# Patient Record
Sex: FEMALE | Race: WHITE
[De-identification: names, ages, dates, MRNs, and addresses within clinical notes are randomized per-mention and may not be internally consistent; named-entity substitution may affect disease eponyms.]

---

## 2018-02-04 ENCOUNTER — HOSPITAL ENCOUNTER (EMERGENCY)
Dept: HOSPITAL 8 - ED | Age: 68
Discharge: HOME | End: 2018-02-04
Payer: MEDICARE

## 2018-02-04 VITALS — DIASTOLIC BLOOD PRESSURE: 95 MMHG | SYSTOLIC BLOOD PRESSURE: 174 MMHG

## 2018-02-04 VITALS — HEIGHT: 62 IN | BODY MASS INDEX: 20.37 KG/M2 | WEIGHT: 110.67 LBS

## 2018-02-04 DIAGNOSIS — S29.012A: Primary | ICD-10-CM
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
## 2018-02-04 DIAGNOSIS — M51.34: ICD-10-CM
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
## 2018-02-04 DIAGNOSIS — E11.9: ICD-10-CM

## 2018-02-04 LAB
CULTURE INDICATED?: NO
MICROSCOPIC: (no result)

## 2018-02-04 PROCEDURE — 99285 EMERGENCY DEPT VISIT HI MDM: CPT

## 2018-02-04 PROCEDURE — 72072 X-RAY EXAM THORAC SPINE 3VWS: CPT

## 2018-02-04 PROCEDURE — 81003 URINALYSIS AUTO W/O SCOPE: CPT

## 2018-03-08 ENCOUNTER — HOSPITAL ENCOUNTER (OUTPATIENT)
Dept: HOSPITAL 8 - CFH | Age: 68
Discharge: HOME | End: 2018-03-08
Attending: INTERNAL MEDICINE
Payer: MEDICARE

## 2018-03-08 DIAGNOSIS — I08.1: Primary | ICD-10-CM

## 2018-03-08 DIAGNOSIS — Z87.891: ICD-10-CM

## 2018-03-08 DIAGNOSIS — Z85.820: ICD-10-CM

## 2018-03-08 PROCEDURE — 93306 TTE W/DOPPLER COMPLETE: CPT

## 2019-05-21 ENCOUNTER — HOSPITAL ENCOUNTER (OUTPATIENT)
Dept: HOSPITAL 8 - STAR | Age: 69
Discharge: HOME | End: 2019-05-21
Attending: SURGERY
Payer: MEDICARE

## 2019-05-21 DIAGNOSIS — K40.90: ICD-10-CM

## 2019-05-21 DIAGNOSIS — Z01.818: Primary | ICD-10-CM

## 2019-05-21 LAB
ALBUMIN SERPL-MCNC: 3.8 G/DL (ref 3.4–5)
ALP SERPL-CCNC: 65 U/L (ref 45–117)
ALT SERPL-CCNC: 32 U/L (ref 12–78)
ANION GAP SERPL CALC-SCNC: 9 MMOL/L (ref 5–15)
BILIRUB SERPL-MCNC: 0.5 MG/DL (ref 0.2–1)
CALCIUM SERPL-MCNC: 8.7 MG/DL (ref 8.5–10.1)
CHLORIDE SERPL-SCNC: 94 MMOL/L (ref 98–107)
CREAT SERPL-MCNC: 0.7 MG/DL (ref 0.55–1.02)
PROT SERPL-MCNC: 6.6 G/DL (ref 6.4–8.2)

## 2019-05-21 PROCEDURE — 80053 COMPREHEN METABOLIC PANEL: CPT

## 2019-05-21 PROCEDURE — 36415 COLL VENOUS BLD VENIPUNCTURE: CPT

## 2019-05-21 PROCEDURE — 93005 ELECTROCARDIOGRAM TRACING: CPT

## 2019-05-30 ENCOUNTER — HOSPITAL ENCOUNTER (OUTPATIENT)
Dept: HOSPITAL 8 - OUT | Age: 69
Discharge: HOME | End: 2019-05-30
Attending: SURGERY
Payer: MEDICARE

## 2019-05-30 VITALS — WEIGHT: 102.07 LBS | BODY MASS INDEX: 18.78 KG/M2 | HEIGHT: 62 IN

## 2019-05-30 VITALS — DIASTOLIC BLOOD PRESSURE: 85 MMHG | SYSTOLIC BLOOD PRESSURE: 165 MMHG

## 2019-05-30 DIAGNOSIS — Z98.890: ICD-10-CM

## 2019-05-30 DIAGNOSIS — K40.90: Primary | ICD-10-CM

## 2019-05-30 DIAGNOSIS — Z90.710: ICD-10-CM

## 2019-05-30 DIAGNOSIS — Z90.49: ICD-10-CM

## 2019-05-30 DIAGNOSIS — I10: ICD-10-CM

## 2019-05-30 DIAGNOSIS — K21.9: ICD-10-CM

## 2019-05-30 PROCEDURE — C1781 MESH (IMPLANTABLE): HCPCS

## 2019-05-30 PROCEDURE — 49650 LAP ING HERNIA REPAIR INIT: CPT

## 2019-07-31 ENCOUNTER — HOSPITAL ENCOUNTER (OUTPATIENT)
Dept: HOSPITAL 8 - CFH | Age: 69
Discharge: HOME | End: 2019-07-31
Attending: INTERNAL MEDICINE
Payer: MEDICARE

## 2019-07-31 DIAGNOSIS — R07.89: Primary | ICD-10-CM

## 2019-07-31 PROCEDURE — A9502 TC99M TETROFOSMIN: HCPCS

## 2019-07-31 PROCEDURE — 93017 CV STRESS TEST TRACING ONLY: CPT

## 2019-07-31 PROCEDURE — 78452 HT MUSCLE IMAGE SPECT MULT: CPT

## 2019-11-20 ENCOUNTER — HOSPITAL ENCOUNTER (OUTPATIENT)
Dept: HOSPITAL 8 - CVU | Age: 69
Discharge: HOME | End: 2019-11-20
Attending: INTERNAL MEDICINE
Payer: MEDICARE

## 2019-11-20 DIAGNOSIS — Z87.891: ICD-10-CM

## 2019-11-20 DIAGNOSIS — I10: ICD-10-CM

## 2019-11-20 DIAGNOSIS — I65.21: Primary | ICD-10-CM

## 2019-11-20 PROCEDURE — 93880 EXTRACRANIAL BILAT STUDY: CPT

## 2020-03-15 ENCOUNTER — HOSPITAL ENCOUNTER (EMERGENCY)
Dept: HOSPITAL 8 - ED | Age: 70
LOS: 1 days | Discharge: HOME | End: 2020-03-16
Payer: MEDICARE

## 2020-03-15 VITALS — HEIGHT: 62 IN | BODY MASS INDEX: 19.23 KG/M2 | WEIGHT: 104.5 LBS

## 2020-03-15 VITALS — DIASTOLIC BLOOD PRESSURE: 88 MMHG | SYSTOLIC BLOOD PRESSURE: 130 MMHG
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
## 2020-03-15 DIAGNOSIS — E87.1: ICD-10-CM
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
## 2020-03-15 DIAGNOSIS — S09.90XA: Primary | ICD-10-CM

## 2020-03-15 DIAGNOSIS — R55: ICD-10-CM
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
## 2020-03-15 DIAGNOSIS — I25.2: ICD-10-CM

## 2020-03-15 DIAGNOSIS — M54.2: ICD-10-CM

## 2020-03-15 DIAGNOSIS — R42: ICD-10-CM

## 2020-03-15 DIAGNOSIS — Z87.891: ICD-10-CM

## 2020-03-15 DIAGNOSIS — E11.9: ICD-10-CM

## 2020-03-15 LAB
ALBUMIN SERPL-MCNC: 3.9 G/DL (ref 3.4–5)
ALP SERPL-CCNC: 68 U/L (ref 45–117)
ALT SERPL-CCNC: 46 U/L (ref 12–78)
ANION GAP SERPL CALC-SCNC: 14 MMOL/L (ref 5–15)
BASOPHILS # BLD AUTO: 0.01 X10^3/UL (ref 0–0.1)
BASOPHILS NFR BLD AUTO: 0 % (ref 0–1)
BILIRUB SERPL-MCNC: 1.4 MG/DL (ref 0.2–1)
CALCIUM SERPL-MCNC: 8.5 MG/DL (ref 8.5–10.1)
CHLORIDE SERPL-SCNC: 89 MMOL/L (ref 98–107)
CREAT SERPL-MCNC: 0.69 MG/DL (ref 0.55–1.02)
CULTURE INDICATED?: NO
EOSINOPHIL # BLD AUTO: 0.01 X10^3/UL (ref 0–0.4)
EOSINOPHIL NFR BLD AUTO: 0 % (ref 1–7)
ERYTHROCYTE [DISTWIDTH] IN BLOOD BY AUTOMATED COUNT: 14 % (ref 9.6–15.2)
LYMPHOCYTES # BLD AUTO: 1.53 X10^3/UL (ref 1–3.4)
LYMPHOCYTES NFR BLD AUTO: 20 % (ref 22–44)
MCH RBC QN AUTO: 32.7 PG (ref 27–34.8)
MCHC RBC AUTO-ENTMCNC: 33.9 G/DL (ref 32.4–35.8)
MCV RBC AUTO: 96.4 FL (ref 80–100)
MD: NO
MICROSCOPIC: (no result)
MONOCYTES # BLD AUTO: 0.48 X10^3/UL (ref 0.2–0.8)
MONOCYTES NFR BLD AUTO: 6 % (ref 2–9)
NEUTROPHILS # BLD AUTO: 5.61 X10^3/UL (ref 1.8–6.8)
NEUTROPHILS NFR BLD AUTO: 73 % (ref 42–75)
PLATELET # BLD AUTO: 289 X10^3/UL (ref 130–400)
PMV BLD AUTO: 7.1 FL (ref 7.4–10.4)
PROT SERPL-MCNC: 6.6 G/DL (ref 6.4–8.2)
RBC # BLD AUTO: 3.94 X10^6/UL (ref 3.82–5.3)
TROPONIN I SERPL-MCNC: < 0.015 NG/ML (ref 0–0.04)

## 2020-03-15 PROCEDURE — 70450 CT HEAD/BRAIN W/O DYE: CPT

## 2020-03-15 PROCEDURE — 84484 ASSAY OF TROPONIN QUANT: CPT

## 2020-03-15 PROCEDURE — 72125 CT NECK SPINE W/O DYE: CPT

## 2020-03-15 PROCEDURE — 85025 COMPLETE CBC W/AUTO DIFF WBC: CPT

## 2020-03-15 PROCEDURE — 96360 HYDRATION IV INFUSION INIT: CPT

## 2020-03-15 PROCEDURE — 81003 URINALYSIS AUTO W/O SCOPE: CPT

## 2020-03-15 PROCEDURE — 99285 EMERGENCY DEPT VISIT HI MDM: CPT

## 2020-03-15 PROCEDURE — 80053 COMPREHEN METABOLIC PANEL: CPT

## 2020-03-15 PROCEDURE — 93005 ELECTROCARDIOGRAM TRACING: CPT

## 2020-03-15 PROCEDURE — 36415 COLL VENOUS BLD VENIPUNCTURE: CPT

## 2020-03-15 PROCEDURE — 71045 X-RAY EXAM CHEST 1 VIEW: CPT

## 2021-01-15 DIAGNOSIS — Z23 NEED FOR VACCINATION: ICD-10-CM

## 2022-08-15 ENCOUNTER — OFFICE VISIT (OUTPATIENT)
Dept: CARDIOLOGY | Facility: MEDICAL CENTER | Age: 72
End: 2022-08-15
Payer: MEDICARE

## 2022-08-15 VITALS
HEIGHT: 62 IN | BODY MASS INDEX: 18.4 KG/M2 | SYSTOLIC BLOOD PRESSURE: 126 MMHG | WEIGHT: 100 LBS | RESPIRATION RATE: 16 BRPM | HEART RATE: 72 BPM | OXYGEN SATURATION: 94 % | DIASTOLIC BLOOD PRESSURE: 84 MMHG

## 2022-08-15 DIAGNOSIS — F41.1 GAD (GENERALIZED ANXIETY DISORDER): ICD-10-CM

## 2022-08-15 DIAGNOSIS — I10 PRIMARY HYPERTENSION: ICD-10-CM

## 2022-08-15 DIAGNOSIS — I47.10 PSVT (PAROXYSMAL SUPRAVENTRICULAR TACHYCARDIA) (HCC): ICD-10-CM

## 2022-08-15 DIAGNOSIS — F40.00 AGORAPHOBIA: ICD-10-CM

## 2022-08-15 PROCEDURE — 99203 OFFICE O/P NEW LOW 30 MIN: CPT | Performed by: INTERNAL MEDICINE

## 2022-08-15 PROCEDURE — 93000 ELECTROCARDIOGRAM COMPLETE: CPT | Performed by: INTERNAL MEDICINE

## 2022-08-15 RX ORDER — MULTIVIT-MIN/IRON/FOLIC ACID/K 18-600-40
4000 CAPSULE ORAL DAILY
COMMUNITY

## 2022-08-15 RX ORDER — AMLODIPINE BESYLATE 2.5 MG/1
2.5 TABLET ORAL 2 TIMES DAILY
Qty: 180 TABLET | Refills: 3 | Status: SHIPPED | OUTPATIENT
Start: 2022-08-15

## 2022-08-15 RX ORDER — ACYCLOVIR 50 MG/G
OINTMENT TOPICAL PRN
COMMUNITY
End: 2023-03-28

## 2022-08-15 RX ORDER — METOPROLOL SUCCINATE 25 MG/1
12.5 TABLET, EXTENDED RELEASE ORAL 2 TIMES DAILY
Qty: 45 TABLET | Refills: 3 | Status: SHIPPED | OUTPATIENT
Start: 2022-08-15 | End: 2022-08-18 | Stop reason: SDUPTHER

## 2022-08-15 RX ORDER — RABEPRAZOLE SODIUM 20 MG/1
20 TABLET, DELAYED RELEASE ORAL DAILY
COMMUNITY

## 2022-08-15 RX ORDER — AMLODIPINE BESYLATE 2.5 MG/1
2.5 TABLET ORAL 2 TIMES DAILY
COMMUNITY
End: 2022-08-15 | Stop reason: SDUPTHER

## 2022-08-15 RX ORDER — METOPROLOL SUCCINATE 25 MG/1
12.5 TABLET, EXTENDED RELEASE ORAL 2 TIMES DAILY
COMMUNITY
End: 2022-08-15 | Stop reason: SDUPTHER

## 2022-08-15 NOTE — PROGRESS NOTES
Chief Complaint   Patient presents with    HTN (Controlled)     New patient       Dejuan Villatoro is a 72 y.o. female who presents today for initial consultation regarding PSVT.  She is a former patient of Dr. Galvan I did not realize that he is back practicing in [facility] after leaving [place].  She has well-characterized brief PSVT with a structurally normal heart and no ischemic heart disease.  She takes her medications as directed and complains of no cardiac symptomology.  She is a non-smoker does not drink or do drugs and has no family history of precocious CAD.  Her most concerning issue is that she indicates she barely ever leaves her home since the pandemic started and is terrified even to visit today at this visit.  She has cut off all social contact with all people that she have her new and is afraid to even go to the doctor's visit.    History reviewed. No pertinent past medical history.  History reviewed. No pertinent surgical history.  History reviewed. No pertinent family history.  Social History     Socioeconomic History    Marital status: Not on file     Spouse name: Not on file    Number of children: Not on file    Years of education: Not on file    Highest education level: Not on file   Occupational History    Not on file   Tobacco Use    Smoking status: Never    Smokeless tobacco: Never   Substance and Sexual Activity    Alcohol use: Never    Drug use: Never    Sexual activity: Not on file   Other Topics Concern    Not on file   Social History Narrative    Not on file     Social Determinants of Health     Financial Resource Strain: Not on file   Food Insecurity: Not on file   Transportation Needs: Not on file   Physical Activity: Not on file   Stress: Not on file   Social Connections: Not on file   Intimate Partner Violence: Not on file   Housing Stability: Not on file     Not on File  Outpatient Encounter Medications as of 8/15/2022   Medication Sig Dispense Refill    rabeprazole  "(ACIPHEX) 20 MG tablet Take 20 mg by mouth every day.      acyclovir (ZOVIRAX) 5 % Ointment Apply  topically as needed.      artificial tears (EYE LUBRICANT) Ointment ophth ointment Apply 1 Application to both eyes every 8 hours.      Cholecalciferol (VITAMIN D) 50 MCG (2000 UT) Cap Take  by mouth.      Multiple Vitamin (MULTIVITAMIN ADULT PO) Take  by mouth.      amLODIPine (NORVASC) 2.5 MG Tab Take 1 Tablet by mouth 2 times a day. 180 Tablet 3    metoprolol SR (TOPROL XL) 25 MG TABLET SR 24 HR Take 0.5 Tablets by mouth 2 times a day. 12.5 mg BID 45 Tablet 3    [DISCONTINUED] metoprolol SR (TOPROL XL) 25 MG TABLET SR 24 HR Take 12.5 mg by mouth 2 times a day. 12.5 mg BID      [DISCONTINUED] amLODIPine (NORVASC) 2.5 MG Tab Take 2.5 mg by mouth 2 times a day.       No facility-administered encounter medications on file as of 8/15/2022.     Review of Systems   All other systems reviewed and are negative.           Objective     /84 (BP Location: Left arm, Patient Position: Sitting)   Pulse 72   Resp 16   Ht 1.575 m (5' 2\")   Wt 45.4 kg (100 lb)   SpO2 94%   BMI 18.29 kg/m²     Physical Exam  Vitals and nursing note reviewed.   Constitutional:       General: She is not in acute distress.     Appearance: Normal appearance.   HENT:      Head: Normocephalic and atraumatic.      Right Ear: External ear normal.      Left Ear: External ear normal.      Nose: Nose normal.   Eyes:      Conjunctiva/sclera: Conjunctivae normal.   Cardiovascular:      Rate and Rhythm: Normal rate and regular rhythm.      Pulses: Normal pulses.      Heart sounds: No murmur heard.  Pulmonary:      Effort: Pulmonary effort is normal. No respiratory distress.      Breath sounds: Normal breath sounds.   Abdominal:      General: There is no distension.      Palpations: Abdomen is soft.   Musculoskeletal:      Cervical back: No rigidity or tenderness.      Right lower leg: No edema.      Left lower leg: No edema.   Skin:     General: Skin is " warm and dry.      Capillary Refill: Capillary refill takes 2 to 3 seconds.   Neurological:      General: No focal deficit present.      Mental Status: She is alert and oriented to person, place, and time.   Psychiatric:         Mood and Affect: Mood normal.         Behavior: Behavior normal.         Thought Content: Thought content normal.     EKG (8/15/2022):  I have personally reviewed the EKG this visit and discussed with the patient.  Sinus rhythm 65 bpm left atrial abnormality.           Assessment & Plan     1. Primary hypertension  EKG    amLODIPine (NORVASC) 2.5 MG Tab    metoprolol SR (TOPROL XL) 25 MG TABLET SR 24 HR      2. PSVT (paroxysmal supraventricular tachycardia) ([facility])  metoprolol SR (TOPROL XL) 25 MG TABLET SR 24 HR      3. WAQAR (generalized anxiety disorder)  Referral to Psychology      4. Agoraphobia  Referral to Psychology          Medical Decision Making: Today's Assessment/Status/Plan:          From a cardiac standpoint she is doing well with well-managed PSVT which is brief nonsustained and her medications were refilled without change.  More pressing is for severe generalized anxiety that is impacting her health as well as  interfering with her interface with medical treatment.  It also appears to have a component of agoraphobia.  We discussed that I am not an expert in these matters and I recommend professional evaluation and management.  She is agreeable to talk with a psychologist if available.  I will make a referral.  Otherwise continue current medical therapy.      We did discuss that Dr. Galvan is back in practice and together we looked up his website and I wrote down the phone number and address for her as she would like to continue to be as patient.  I have encouraged her to do so.  I am happy to see her if he is not available from time to time as well.    Thank you for this interesting consultation. It was my pleasure to see Tonia Villatoro today.    Malcolm Bender MD, FACC,  FSCAI  Division of Interventional Cardiology  [facility]

## 2022-08-16 LAB — EKG IMPRESSION: NORMAL

## 2022-08-18 ENCOUNTER — TELEPHONE (OUTPATIENT)
Dept: CARDIOLOGY | Facility: MEDICAL CENTER | Age: 72
End: 2022-08-18
Payer: MEDICARE

## 2022-08-18 DIAGNOSIS — I10 PRIMARY HYPERTENSION: ICD-10-CM

## 2022-08-18 DIAGNOSIS — I47.10 PSVT (PAROXYSMAL SUPRAVENTRICULAR TACHYCARDIA) (HCC): ICD-10-CM

## 2022-08-18 RX ORDER — METOPROLOL SUCCINATE 25 MG/1
12.5 TABLET, EXTENDED RELEASE ORAL 2 TIMES DAILY
Qty: 45 TABLET | Refills: 0 | Status: SHIPPED | OUTPATIENT
Start: 2022-08-18 | End: 2022-10-02

## 2022-08-18 RX ORDER — METOPROLOL SUCCINATE 25 MG/1
12.5 TABLET, EXTENDED RELEASE ORAL 2 TIMES DAILY
Qty: 90 TABLET | Refills: 3 | Status: SHIPPED | OUTPATIENT
Start: 2022-08-18

## 2022-08-18 NOTE — TELEPHONE ENCOUNTER
Noted RX for metoprolol was sent for 45 tablets, sig is 0.5 tablets BID. RX re-sent for 90 tablets for 90-day supply.    Called pt and notified her of above. She said she only got a bottle of 45 tablets. She is requesting another bottle of 45 tablets to complete her 90-day supply. Separate RX sent. Reassured pt will call [facility] to confirm RXs were received. She is requesting a call back with this confirmation.    Will call [facility] a little later to allow time for processing and call pt with updates.

## 2022-08-18 NOTE — TELEPHONE ENCOUNTER
"Called [facility] pharmacy, spoke with Marlon and confirmed he received metoprolol RXs. Per Marlon, insurance coverage will kick in on 09/07/22 however pt can  prescription now out-of-pocket.    Called pt and advised above. She said, \"I get told one thing and it's done differently. Me calling is a totally different thing, it's like they don't have time.\"    Pt expressed in detail her concerns about not wanting to leave her house and her current partner Juanjose is the person who normally picks up her prescriptions after work. She expressed that she doesn't have any family or any other person that can help her. Inquired pt if she would benefit from a case or a  and she declined.    Pt expressed concerns and fear that her prescription is now \"all screwed up\" and she will not get the refills that she usually gets. Reassured pt to the best of my ability that there are separate prescriptions for her metoprolol and she will still have enough refills for a full year until next August.     Reassured pt that [facility] can call our office if needed and she can call our office if any issues/further concerns. She verbalized understanding and was very appreciative.    50 minutes total spent on telephone encounter and additional 10 minutes for charting.  "

## 2022-08-18 NOTE — TELEPHONE ENCOUNTER
TW      Caller: Tonia Villatoro    Topic/issue: Patient called because she only got half of her medication for metoprolol SR (TOPROL XL) 25 MG TABLET SR 24 HR.  It was only half the amount and she was asking what was going on with her prescription  Callback Number: [phone] (home)       Thank you  -Filiberto HUERTA

## 2022-08-19 NOTE — TELEPHONE ENCOUNTER
Called pt and discussed further. She said she received a call from [employer] advising her that they will cover her prescription for a 90-day supply. Pt requested that this RN call [facility] pharmacy to confirm this information and that she will have 3 additional refills after she picks up her prescription.    Called [facility] pharmacy and spoke with pharmacist. They received confirmation today that pt's insurance will cover the prescription and she has 3 additional refills available.    Called and relayed this information to pt. She expressed that she was very concerned that the pharmacy will note that she only has 2 refills left of the medication after she picks up her prescription because she had already picked up a 45-tablet bottle 2 days ago.    Reassured pt that this is separate prescription from what she picked up 2 days ago and assured her that there will be an additional 3 refills after she picks it up. The pt then kept insisting that this RN should call back the pharmacy to confirm this information. Pt also repeated multiple times that the pharmacy will note that she will only have 2 refills left and not 3. Reassured pt to the best of my ability that this will not happen. If any issues, she can have the pharmacy give our office a call.    She then expressed concerns that if the pharmacy calls our office, they will not wait on hold to discuss the prescription. Pt repeated this information multiple times. Advised pt again that she can  her prescription and there will be 3 additional refills. She will have her spouse  the medication and advised she should call him while he's in the pharmacy for her reassurance.    She verbalized understanding and she will call her , appreciative of call back.    21 minutes spend on the phone reassuring pt and additional 20 minutes spent with charting and discussion with practice manager.

## 2022-08-19 NOTE — TELEPHONE ENCOUNTER
Caller:  Marilu Randall    Topic/issue: Tonia is still concerned about the correct amount on this script, before picking up.      Callback Number: [phone]    Thank you,   Cyndee HICKS

## 2023-03-28 ENCOUNTER — APPOINTMENT (OUTPATIENT)
Dept: RADIOLOGY | Facility: MEDICAL CENTER | Age: 73
End: 2023-03-28
Attending: EMERGENCY MEDICINE
Payer: MEDICARE

## 2023-03-28 ENCOUNTER — HOSPITAL ENCOUNTER (OUTPATIENT)
Facility: MEDICAL CENTER | Age: 73
End: 2023-03-29
Attending: EMERGENCY MEDICINE | Admitting: STUDENT IN AN ORGANIZED HEALTH CARE EDUCATION/TRAINING PROGRAM
Payer: MEDICARE

## 2023-03-28 DIAGNOSIS — R41.3 MEMORY CHANGES: ICD-10-CM

## 2023-03-28 DIAGNOSIS — I10 PRIMARY HYPERTENSION: ICD-10-CM

## 2023-03-28 DIAGNOSIS — R29.6 MULTIPLE FALLS: ICD-10-CM

## 2023-03-28 DIAGNOSIS — R07.9 CHEST PAIN, UNSPECIFIED TYPE: ICD-10-CM

## 2023-03-28 DIAGNOSIS — R55 NEAR SYNCOPE: ICD-10-CM

## 2023-03-28 PROBLEM — D72.829 LEUKOCYTOSIS: Status: ACTIVE | Noted: 2023-03-28
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
## 2023-03-28 LAB
ALBUMIN SERPL BCP-MCNC: 4.4 G/DL (ref 3.2–4.9)
ALBUMIN/GLOB SERPL: 1.5 G/DL
ALP SERPL-CCNC: 132 U/L (ref 30–99)
ALT SERPL-CCNC: 48 U/L (ref 2–50)
ANION GAP SERPL CALC-SCNC: 12 MMOL/L (ref 7–16)
AST SERPL-CCNC: 46 U/L (ref 12–45)
BASOPHILS # BLD AUTO: 0.1 % (ref 0–1.8)
BASOPHILS # BLD: 0.02 K/UL (ref 0–0.12)
BILIRUB SERPL-MCNC: 0.9 MG/DL (ref 0.1–1.5)
BUN SERPL-MCNC: 15 MG/DL (ref 8–22)
CALCIUM ALBUM COR SERPL-MCNC: 9.3 MG/DL (ref 8.5–10.5)
CALCIUM SERPL-MCNC: 9.6 MG/DL (ref 8.5–10.5)
CHLORIDE SERPL-SCNC: 97 MMOL/L (ref 96–112)
CO2 SERPL-SCNC: 25 MMOL/L (ref 20–33)
CREAT SERPL-MCNC: 0.66 MG/DL (ref 0.5–1.4)
EKG IMPRESSION: NORMAL
EOSINOPHIL # BLD AUTO: 0 K/UL (ref 0–0.51)
EOSINOPHIL NFR BLD: 0 % (ref 0–6.9)
ERYTHROCYTE [DISTWIDTH] IN BLOOD BY AUTOMATED COUNT: 50.4 FL (ref 35.9–50)
GFR SERPLBLD CREATININE-BSD FMLA CKD-EPI: 92 ML/MIN/1.73 M 2
GLOBULIN SER CALC-MCNC: 3 G/DL (ref 1.9–3.5)
GLUCOSE SERPL-MCNC: 132 MG/DL (ref 65–99)
HCT VFR BLD AUTO: 37.8 % (ref 37–47)
HGB BLD-MCNC: 12.4 G/DL (ref 12–16)
IMM GRANULOCYTES # BLD AUTO: 0.07 K/UL (ref 0–0.11)
IMM GRANULOCYTES NFR BLD AUTO: 0.5 % (ref 0–0.9)
LYMPHOCYTES # BLD AUTO: 0.71 K/UL (ref 1–4.8)
LYMPHOCYTES NFR BLD: 5 % (ref 22–41)
MCH RBC QN AUTO: 30.3 PG (ref 27–33)
MCHC RBC AUTO-ENTMCNC: 32.8 G/DL (ref 33.6–35)
MCV RBC AUTO: 92.4 FL (ref 81.4–97.8)
MONOCYTES # BLD AUTO: 0.83 K/UL (ref 0–0.85)
MONOCYTES NFR BLD AUTO: 5.8 % (ref 0–13.4)
NEUTROPHILS # BLD AUTO: 12.59 K/UL (ref 2–7.15)
NEUTROPHILS NFR BLD: 88.6 % (ref 44–72)
NRBC # BLD AUTO: 0 K/UL
NRBC BLD-RTO: 0 /100 WBC
PLATELET # BLD AUTO: 325 K/UL (ref 164–446)
PMV BLD AUTO: 9.3 FL (ref 9–12.9)
POTASSIUM SERPL-SCNC: 4.5 MMOL/L (ref 3.6–5.5)
PROT SERPL-MCNC: 7.4 G/DL (ref 6–8.2)
RBC # BLD AUTO: 4.09 M/UL (ref 4.2–5.4)
SODIUM SERPL-SCNC: 134 MMOL/L (ref 135–145)
TROPONIN T SERPL-MCNC: 22 NG/L (ref 6–19)
TROPONIN T SERPL-MCNC: 24 NG/L (ref 6–19)
WBC # BLD AUTO: 14.2 K/UL (ref 4.8–10.8)

## 2023-03-28 PROCEDURE — G0378 HOSPITAL OBSERVATION PER HR: HCPCS

## 2023-03-28 PROCEDURE — 96375 TX/PRO/DX INJ NEW DRUG ADDON: CPT | Mod: XU

## 2023-03-28 PROCEDURE — 72125 CT NECK SPINE W/O DYE: CPT

## 2023-03-28 PROCEDURE — 71045 X-RAY EXAM CHEST 1 VIEW: CPT

## 2023-03-28 PROCEDURE — 700117 HCHG RX CONTRAST REV CODE 255: Performed by: EMERGENCY MEDICINE

## 2023-03-28 PROCEDURE — 96374 THER/PROPH/DIAG INJ IV PUSH: CPT | Mod: XU

## 2023-03-28 PROCEDURE — 72128 CT CHEST SPINE W/O DYE: CPT

## 2023-03-28 PROCEDURE — 72131 CT LUMBAR SPINE W/O DYE: CPT

## 2023-03-28 PROCEDURE — 93005 ELECTROCARDIOGRAM TRACING: CPT | Performed by: EMERGENCY MEDICINE

## 2023-03-28 PROCEDURE — 85025 COMPLETE CBC W/AUTO DIFF WBC: CPT

## 2023-03-28 PROCEDURE — 80053 COMPREHEN METABOLIC PANEL: CPT

## 2023-03-28 PROCEDURE — 96372 THER/PROPH/DIAG INJ SC/IM: CPT | Mod: XU

## 2023-03-28 PROCEDURE — 99285 EMERGENCY DEPT VISIT HI MDM: CPT

## 2023-03-28 PROCEDURE — 700102 HCHG RX REV CODE 250 W/ 637 OVERRIDE(OP)

## 2023-03-28 PROCEDURE — A9270 NON-COVERED ITEM OR SERVICE: HCPCS

## 2023-03-28 PROCEDURE — 84484 ASSAY OF TROPONIN QUANT: CPT

## 2023-03-28 PROCEDURE — 700111 HCHG RX REV CODE 636 W/ 250 OVERRIDE (IP)

## 2023-03-28 PROCEDURE — 71260 CT THORAX DX C+: CPT

## 2023-03-28 PROCEDURE — 700111 HCHG RX REV CODE 636 W/ 250 OVERRIDE (IP): Performed by: EMERGENCY MEDICINE

## 2023-03-28 PROCEDURE — 36415 COLL VENOUS BLD VENIPUNCTURE: CPT

## 2023-03-28 PROCEDURE — 70450 CT HEAD/BRAIN W/O DYE: CPT

## 2023-03-28 PROCEDURE — 93005 ELECTROCARDIOGRAM TRACING: CPT

## 2023-03-28 PROCEDURE — 99223 1ST HOSP IP/OBS HIGH 75: CPT | Performed by: STUDENT IN AN ORGANIZED HEALTH CARE EDUCATION/TRAINING PROGRAM

## 2023-03-28 PROCEDURE — 700101 HCHG RX REV CODE 250

## 2023-03-28 RX ORDER — AMLODIPINE BESYLATE 5 MG/1
2.5 TABLET ORAL
Status: DISCONTINUED | OUTPATIENT
Start: 2023-03-28 | End: 2023-03-29 | Stop reason: HOSPADM

## 2023-03-28 RX ORDER — CARBOXYMETHYLCELLULOSE SODIUM 10 MG/ML
1 GEL OPHTHALMIC PRN
COMMUNITY

## 2023-03-28 RX ORDER — ONDANSETRON 2 MG/ML
4 INJECTION INTRAMUSCULAR; INTRAVENOUS ONCE
Status: COMPLETED | OUTPATIENT
Start: 2023-03-28 | End: 2023-03-28

## 2023-03-28 RX ORDER — METOPROLOL SUCCINATE 25 MG/1
12.5 TABLET, EXTENDED RELEASE ORAL 2 TIMES DAILY
Status: DISCONTINUED | OUTPATIENT
Start: 2023-03-28 | End: 2023-03-29 | Stop reason: HOSPADM

## 2023-03-28 RX ORDER — MORPHINE SULFATE 4 MG/ML
4 INJECTION INTRAVENOUS ONCE
Status: COMPLETED | OUTPATIENT
Start: 2023-03-28 | End: 2023-03-28

## 2023-03-28 RX ORDER — ENOXAPARIN SODIUM 100 MG/ML
30 INJECTION SUBCUTANEOUS DAILY
Status: DISCONTINUED | OUTPATIENT
Start: 2023-03-28 | End: 2023-03-29 | Stop reason: HOSPADM

## 2023-03-28 RX ORDER — LIDOCAINE 50 MG/G
1 PATCH TOPICAL EVERY 24 HOURS
Status: DISCONTINUED | OUTPATIENT
Start: 2023-03-28 | End: 2023-03-29 | Stop reason: HOSPADM

## 2023-03-28 RX ORDER — CARBOXYMETHYLCELLULOSE SODIUM 10 MG/ML
1 GEL OPHTHALMIC PRN
Status: DISCONTINUED | OUTPATIENT
Start: 2023-03-28 | End: 2023-03-29 | Stop reason: HOSPADM

## 2023-03-28 RX ORDER — OMEPRAZOLE 20 MG/1
20 CAPSULE, DELAYED RELEASE ORAL DAILY
Status: DISCONTINUED | OUTPATIENT
Start: 2023-03-29 | End: 2023-03-29 | Stop reason: HOSPADM

## 2023-03-28 RX ADMIN — LIDOCAINE 1 PATCH: 50 PATCH TOPICAL at 21:37

## 2023-03-28 RX ADMIN — MORPHINE SULFATE 4 MG: 4 INJECTION, SOLUTION INTRAMUSCULAR; INTRAVENOUS at 16:44

## 2023-03-28 RX ADMIN — METOPROLOL SUCCINATE 12.5 MG: 25 TABLET, EXTENDED RELEASE ORAL at 21:38

## 2023-03-28 RX ADMIN — ONDANSETRON HYDROCHLORIDE 4 MG: 2 SOLUTION INTRAMUSCULAR; INTRAVENOUS at 16:44

## 2023-03-28 RX ADMIN — IOHEXOL 75 ML: 350 INJECTION, SOLUTION INTRAVENOUS at 17:55

## 2023-03-28 RX ADMIN — ENOXAPARIN SODIUM 30 MG: 30 INJECTION SUBCUTANEOUS at 21:37

## 2023-03-28 RX ADMIN — AMLODIPINE BESYLATE 2.5 MG: 5 TABLET ORAL at 21:38

## 2023-03-28 ASSESSMENT — ENCOUNTER SYMPTOMS
ORTHOPNEA: 0
SHORTNESS OF BREATH: 0
COUGH: 0
HEADACHES: 0
ABDOMINAL PAIN: 0
CLAUDICATION: 0
BRUISES/BLEEDS EASILY: 0
MEMORY LOSS: 1
WEAKNESS: 0
SORE THROAT: 0
DIARRHEA: 0
BLOOD IN STOOL: 0
CONSTIPATION: 0
BLURRED VISION: 0
NAUSEA: 0
CHILLS: 0
WEIGHT LOSS: 0
EYE DISCHARGE: 0
DOUBLE VISION: 0
INSOMNIA: 0
WHEEZING: 0
DIZZINESS: 0
NECK PAIN: 0
SPUTUM PRODUCTION: 0
HEARTBURN: 0
DEPRESSION: 0
PALPITATIONS: 0
SENSORY CHANGE: 0
BACK PAIN: 0
FEVER: 0
FALLS: 1
VOMITING: 0
NERVOUS/ANXIOUS: 0

## 2023-03-28 ASSESSMENT — HEART SCORE
HEART SCORE: 6
TROPONIN: 1-3 TIMES NORMAL LIMIT
RISK FACTORS: 1-2 RISK FACTORS
AGE: 65+
ECG: NON-SPECIFIC REPOLARIZATION DISTURBANCE
HISTORY: MODERATELY SUSPICIOUS

## 2023-03-28 ASSESSMENT — PATIENT HEALTH QUESTIONNAIRE - PHQ9
2. FEELING DOWN, DEPRESSED, IRRITABLE, OR HOPELESS: NOT AT ALL
1. LITTLE INTEREST OR PLEASURE IN DOING THINGS: NOT AT ALL
SUM OF ALL RESPONSES TO PHQ9 QUESTIONS 1 AND 2: 0

## 2023-03-28 ASSESSMENT — LIFESTYLE VARIABLES
EVER HAD A DRINK FIRST THING IN THE MORNING TO STEADY YOUR NERVES TO GET RID OF A HANGOVER: NO
HAVE YOU EVER FELT YOU SHOULD CUT DOWN ON YOUR DRINKING: NO
HAVE PEOPLE ANNOYED YOU BY CRITICIZING YOUR DRINKING: NO
TOTAL SCORE: 0
EVER FELT BAD OR GUILTY ABOUT YOUR DRINKING: NO
HOW MANY TIMES IN THE PAST YEAR HAVE YOU HAD 5 OR MORE DRINKS IN A DAY: 0
TOTAL SCORE: 0
AVERAGE NUMBER OF DAYS PER WEEK YOU HAVE A DRINK CONTAINING ALCOHOL: 0
TOTAL SCORE: 0
ON A TYPICAL DAY WHEN YOU DRINK ALCOHOL HOW MANY DRINKS DO YOU HAVE: 0
CONSUMPTION TOTAL: NEGATIVE
ALCOHOL_USE: NO

## 2023-03-28 ASSESSMENT — FIBROSIS 4 INDEX: FIB4 SCORE: 1.61

## 2023-03-28 ASSESSMENT — PAIN DESCRIPTION - PAIN TYPE: TYPE: ACUTE PAIN

## 2023-03-28 NOTE — ED PROVIDER NOTES
"ED Provider Note    CHIEF COMPLAINT  Chief Complaint   Patient presents with    Chest Pain     Chest pain that started on Sunday. Pt states she has pain all over, more so on her left side. Nonspecific pain.    Other     Multiple complaints, reports she has been more weak since Sunday and has had a couple of falls the previous few days. No head injury reported. Pt states she felt like she was dying but wanted her \"heavenly father to make the decision\". Pt very anxious in triage.        EXTERNAL RECORDS REVIEWED  Outpatient Notes cardiology  her for hypertension, PSVT placed her on amlodipine 5 mg a day and metoprolol XLSR 25 mg 24 hours    HPI/ROS  LIMITATION TO HISTORY   Select: : None  OUTSIDE HISTORIAN(S):  Significant other 's ex- who has been living with her and taking care of her for the last 24 years states that since COVID started she has become agoraphobic and will not leave the house.  She has stopped cooking.  She has been more unsteady on her feet and has fallen 3 times in this last month.  He states that all she does is watch the news and she has had a lot of lapses in memory that are not characteristic for her.  Has not noticed any facial droop or weakness on one side of her body more than the other.    Tonia Villatoro is a 72 y.o. female who presents in the care of her significant other with chest pain that started on Sunday after falling and hitting her chest on the ground.  Patient does not think she hit her head but is complaining of some pain in her neck as well.  States that prior to her fall she had a near syncopal episode.  When she fell she thought she was dying and was waiting for God to make the decision for her.  She says ever since her fall on Sunday she has had a lot of pain in her chest with any motion as well as some pain in her abdomen.  She denies any productive cough fevers or chills.  She states her appetite has been decreased.  States that she has not really left " "the house or done any exercises.  She states that she does have a cardiologist but does not have a primary care doctor.  She states she has been taking her blood pressure medications as directed.  She has a history of tobacco use but currently does not smoke drink or use drugs.    PAST MEDICAL HISTORY       SURGICAL HISTORY  patient denies any surgical history    FAMILY HISTORY  History reviewed. No pertinent family history.    SOCIAL HISTORY  Social History     Tobacco Use    Smoking status: Never    Smokeless tobacco: Never   Substance and Sexual Activity    Alcohol use: Never    Drug use: Never    Sexual activity: Not on file       CURRENT MEDICATIONS  Home Medications       Reviewed by Aline Vera (Pharmacy Tech) on 03/28/23 at 1916  Med List Status: Complete     Medication Last Dose Status   amLODIPine (NORVASC) 2.5 MG Tab 3/27/2023 Active   Cholecalciferol (VITAMIN D) 50 MCG (2000 UT) Cap 3/27/2023 Active   metoprolol SR (TOPROL XL) 25 MG TABLET SR 24 HR 3/28/2023 Active   Multiple Vitamins-Minerals (CENTRUM SILVER 50+WOMEN) Tab 3/28/2023 Active   rabeprazole (ACIPHEX) 20 MG tablet 3/28/2023 Active                    ALLERGIES  No Known Allergies    PHYSICAL EXAM  VITAL SIGNS: /80   Pulse 81   Temp 37.1 °C (98.8 °F) (Temporal)   Resp 18   Ht 1.575 m (5' 2\")   Wt 43.5 kg (96 lb)   SpO2 97%   BMI 17.56 kg/m²      Constitutional: Well developed, thin, No acute distress, Non-toxic appearance.  Anxious  HEENT: Normocephalic, Atraumatic,  external ears normal, pharynx pink,  Mucous  Membranes moist, No rhinorrhea or mucosal edema  Eyes: PERRL, EOMI, Conjunctiva normal, No discharge.   Neck: Normal range of motion, No tenderness, Supple, No stridor.   Lymphatic: No lymphadenopathy    Cardiovascular: Regular Rate and Rhythm, No murmurs,  rubs, or gallops.    Thorax & Lungs: Lungs clear to auscultation bilaterally, No respiratory distress, No wheezes, rhales or rhonchi, positive  chest wall " tenderness worse on the left side of the chest on the right.  No obvious contusions bony step-offs abrasions or subcutaneous emphysema.   Abdomen: Bowel sounds normal, Soft, non tender, non distended,  No pulsatile masses., no rebound guarding or peritoneal signs.   Skin: Warm, Dry, No erythema, No rash,   Back:  No CVA tenderness, paraspinous tenderness in the thoracic spine area bony crepitance step offs or instability.   Extremities: Equal, intact distal pulses, No cyanosis, clubbing or edema,  No tenderness.   Musculoskeletal: Good range of motion in all major joints. No tenderness to palpation or major deformities noted.   Neurologic: Alert & oriented x 3, Cranial nerves II-XII intact, Equal strength and sensation upper and lower extremities bilaterally,  No focal deficits noted.  NIH is 0  Psychiatric: Anxious    DIAGNOSTIC STUDIES / PROCEDURES  EKG  I have independently interpreted this EKG  See below    LABS  Results for orders placed or performed during the hospital encounter of 03/28/23   CBC with Differential   Result Value Ref Range    WBC 14.2 (H) 4.8 - 10.8 K/uL    RBC 4.09 (L) 4.20 - 5.40 M/uL    Hemoglobin 12.4 12.0 - 16.0 g/dL    Hematocrit 37.8 37.0 - 47.0 %    MCV 92.4 81.4 - 97.8 fL    MCH 30.3 27.0 - 33.0 pg    MCHC 32.8 (L) 33.6 - 35.0 g/dL    RDW 50.4 (H) 35.9 - 50.0 fL    Platelet Count 325 164 - 446 K/uL    MPV 9.3 9.0 - 12.9 fL    Neutrophils-Polys 88.60 (H) 44.00 - 72.00 %    Lymphocytes 5.00 (L) 22.00 - 41.00 %    Monocytes 5.80 0.00 - 13.40 %    Eosinophils 0.00 0.00 - 6.90 %    Basophils 0.10 0.00 - 1.80 %    Immature Granulocytes 0.50 0.00 - 0.90 %    Nucleated RBC 0.00 /100 WBC    Neutrophils (Absolute) 12.59 (H) 2.00 - 7.15 K/uL    Lymphs (Absolute) 0.71 (L) 1.00 - 4.80 K/uL    Monos (Absolute) 0.83 0.00 - 0.85 K/uL    Eos (Absolute) 0.00 0.00 - 0.51 K/uL    Baso (Absolute) 0.02 0.00 - 0.12 K/uL    Immature Granulocytes (abs) 0.07 0.00 - 0.11 K/uL    NRBC (Absolute) 0.00 K/uL    Complete Metabolic Panel (CMP)   Result Value Ref Range    Sodium 134 (L) 135 - 145 mmol/L    Potassium 4.5 3.6 - 5.5 mmol/L    Chloride 97 96 - 112 mmol/L    Co2 25 20 - 33 mmol/L    Anion Gap 12.0 7.0 - 16.0    Glucose 132 (H) 65 - 99 mg/dL    Bun 15 8 - 22 mg/dL    Creatinine 0.66 0.50 - 1.40 mg/dL    Calcium 9.6 8.5 - 10.5 mg/dL    AST(SGOT) 46 (H) 12 - 45 U/L    ALT(SGPT) 48 2 - 50 U/L    Alkaline Phosphatase 132 (H) 30 - 99 U/L    Total Bilirubin 0.9 0.1 - 1.5 mg/dL    Albumin 4.4 3.2 - 4.9 g/dL    Total Protein 7.4 6.0 - 8.2 g/dL    Globulin 3.0 1.9 - 3.5 g/dL    A-G Ratio 1.5 g/dL   Troponins NOW   Result Value Ref Range    Troponin T 24 (H) 6 - 19 ng/L   ESTIMATED GFR   Result Value Ref Range    GFR (CKD-EPI) 92 >60 mL/min/1.73 m 2   CORRECTED CALCIUM   Result Value Ref Range    Correct Calcium 9.3 8.5 - 10.5 mg/dL   EKG   Result Value Ref Range    Report       [facility]    Test Date:  2023  Pt Name:    SHANNA KEENE                Department: ER  MRN:        [id number]                      Room:  Gender:     Female                       Technician: [id number]  :        [id number]                   Requested By:ER TRIAGE PROTOCOL  Order #:    [id number]                    Reading MD: CLIFF GRACIA MD    Measurements  Intervals                                Axis  Rate:       82                           P:          76  NE:         163                          QRS:        58  QRSD:       87                           T:          59  QT:         382  QTc:        446    Interpretive Statements  Sinus rhythm  LAE, consider biatrial enlargement  Borderline low voltage, extremity leads  Compared to ECG 08/15/2022 14:06:15  No significant changes  Electronically Signed On 3- 16:00:43 PDT by CLIFF GRACIA MD           RADIOLOGY  I have independently interpreted the diagnostic imaging associated with this visit and am waiting the final reading from the radiologist.   My  preliminary interpretation is as follows: CXR: no Infiltrates or pneumothorax or pleural effusion  Radiologist interpretation:  CT-LSPINE W/O PLUS RECONS   Final Result      NO ACUTE FRACTURE OR DISLOCATION IS PRESENT IN THE LUMBAR SPINE.      CT-TSPINE W/O PLUS RECONS   Final Result      No evidence of fracture or dislocation of the thoracic spine.      CT-CSPINE WITHOUT PLUS RECONS   Final Result      1.  No evidence of cervical spine fracture.      2.  Multilevel degenerative disc disease and facet degeneration.      3.  Multilevel degenerative subluxation.      CT-HEAD W/O   Final Result         NO ACUTE ABNORMALITIES ARE NOTED ON CT SCAN OF THE HEAD.      Findings are consistent with atrophy.  Decreased attenuation in the periventricular white matter likely indicates microvascular ischemic disease.         CT-CHEST,ABDOMEN,PELVIS WITH   Final Result      1.  No solid organ injury identified.      2.  Post cholecystectomy. Mild intrahepatic biliary ductal dilatation.      3.  Follow-up thyroid ultrasound is recommended for left-sided thyroid nodule.      DX-CHEST-PORTABLE (1 VIEW)   Final Result      No acute cardiopulmonary disease.            COURSE & MEDICAL DECISION MAKING    ED Observation Status? Yes; I am placing the patient in to an observation status due to a diagnostic uncertainty as well as therapeutic intensity. Patient placed in observation status at 4:27 PM, 3/28/2023.     Observation plan is as follows: CT of all but the face were ordered because of the patient's pain after a fall on Sunday.  CBC CMP troponin and EKG were also ordered as a fall could have been due to cardiac ischemia.  She could also have an infection in her be anemic.    Upon Reevaluation, the patient's condition has: not improved; and will be escalated to hospitalization.    Patient discharged from ED Observation status at 6:47 PM  (Time) 6:47pm (Date).     INITIAL ASSESSMENT, COURSE AND PLAN  Care Narrative: This is a  72-year-old female who has had increasing difficulty with her ADLs.  She has been agoraphobic since COVID and has not gone to a primary care doctor in many years.  She still does see her cardiologist and is on blood pressure medications.  She has taken several falls this month and has become increasingly weak and forgetful according to her significant other.  The patient tells me that she fell on Sunday and hurt the left side of her body and has been having pain in her left chest and abdomen.  She feels generally weak and very anxious.  I have ordered CT of all but her face as well as lab work to further evaluate the patient's symptoms.  I did give her some morphine and Zofran for pain and nausea.  I am concerned about rib fractures, spine fracture, intracranial hemorrhage, dehydration, anemia, renal insufficiency, infection        ADDITIONAL PROBLEM LIST  Anxiety and agoraphobia  Hypertension  DISPOSITION AND DISCUSSIONS    CT head C-spine chest abdomen pelvis T and LS-spine did not show any fractures pneumothorax or intra-abdominal pathology.  Her white count is slightly elevated at 14.2.  Competence etabolic panel has a glucose of 132 and slightly elevated AST at 46 but otherwise her kidney function and liver function and electrolytes are normal.  Her troponin is slightly elevated at 24.Along with her nonspecific EKG age and risk factors this gives her a heart score of 6.  The patient will be admitted to the hospitalist service for further evaluation of her chest pain as well as her frequent falls and memory deficits.  I have spoken with her and her significant other and they are agreeable to this plan.    I have discussed management of the patient with the following physicians and JT's:  [facility].    Discussion of management with other [facility] or appropriate source(s): None     Escalation of care considered, and ultimately not performed:  none    Barriers to care at this time, including but not limited to: Patient does not have established PCP.     Decision tools and prescription drugs considered including, but not limited to: HEART Score 6 .  NIH Score of 0      She will be admitted in guarded condition  FINAL DIAGNOSIS  1. Chest pain, unspecified type    2. Near syncope    3. Multiple falls    4. Memory changes           Electronically signed by: Nella Jay M.D., 3/28/2023 4:02 PM

## 2023-03-28 NOTE — ED TRIAGE NOTES
"Tonia Villatoro  72 y.o.    Chief Complaint   Patient presents with    Chest Pain     Chest pain that started on Sunday. Pt states she has pain all over, more so on her left side. Nonspecific pain.    Other     Multiple complaints, reports she has been more weak since Sunday and has had a couple of falls the previous few days. No head injury reported. Pt states she felt like she was dying but wanted her \"heavenly father to make the decision\". Pt very anxious in triage.        /80   Pulse 81   Temp 37.1 °C (98.8 °F) (Temporal)   Resp 18   Ht 1.575 m (5' 2\")   Wt 43.5 kg (96 lb)   SpO2 97%   BMI 17.56 kg/m²     Protocol placed, pt educated to alert staff with any changes or concerns.   "

## 2023-03-29 ENCOUNTER — PHARMACY VISIT (OUTPATIENT)
Dept: PHARMACY | Facility: MEDICAL CENTER | Age: 73
End: 2023-03-29
Payer: COMMERCIAL

## 2023-03-29 VITALS
TEMPERATURE: 98.4 F | OXYGEN SATURATION: 94 % | HEIGHT: 62 IN | WEIGHT: 95.9 LBS | BODY MASS INDEX: 17.65 KG/M2 | HEART RATE: 74 BPM | SYSTOLIC BLOOD PRESSURE: 127 MMHG | RESPIRATION RATE: 17 BRPM | DIASTOLIC BLOOD PRESSURE: 72 MMHG

## 2023-03-29 PROBLEM — R07.89 MUSCULOSKELETAL CHEST PAIN: Status: ACTIVE | Noted: 2023-03-28

## 2023-03-29 LAB
ALBUMIN SERPL BCP-MCNC: 3.7 G/DL (ref 3.2–4.9)
ALBUMIN/GLOB SERPL: 1.3 G/DL
ALP SERPL-CCNC: 113 U/L (ref 30–99)
ALT SERPL-CCNC: 35 U/L (ref 2–50)
ANION GAP SERPL CALC-SCNC: 13 MMOL/L (ref 7–16)
AST SERPL-CCNC: 33 U/L (ref 12–45)
BASOPHILS # BLD AUTO: 0.3 % (ref 0–1.8)
BASOPHILS # BLD: 0.03 K/UL (ref 0–0.12)
BILIRUB SERPL-MCNC: 0.8 MG/DL (ref 0.1–1.5)
BUN SERPL-MCNC: 10 MG/DL (ref 8–22)
CALCIUM ALBUM COR SERPL-MCNC: 9.1 MG/DL (ref 8.5–10.5)
CALCIUM SERPL-MCNC: 8.9 MG/DL (ref 8.5–10.5)
CHLORIDE SERPL-SCNC: 100 MMOL/L (ref 96–112)
CHOLEST SERPL-MCNC: 176 MG/DL (ref 100–199)
CO2 SERPL-SCNC: 25 MMOL/L (ref 20–33)
CREAT SERPL-MCNC: 0.43 MG/DL (ref 0.5–1.4)
EOSINOPHIL # BLD AUTO: 0.02 K/UL (ref 0–0.51)
EOSINOPHIL NFR BLD: 0.2 % (ref 0–6.9)
ERYTHROCYTE [DISTWIDTH] IN BLOOD BY AUTOMATED COUNT: 49.4 FL (ref 35.9–50)
EST. AVERAGE GLUCOSE BLD GHB EST-MCNC: 117 MG/DL
GFR SERPLBLD CREATININE-BSD FMLA CKD-EPI: 102 ML/MIN/1.73 M 2
GLOBULIN SER CALC-MCNC: 2.9 G/DL (ref 1.9–3.5)
GLUCOSE SERPL-MCNC: 88 MG/DL (ref 65–99)
HBA1C MFR BLD: 5.7 % (ref 4–5.6)
HCT VFR BLD AUTO: 35.5 % (ref 37–47)
HDLC SERPL-MCNC: 102 MG/DL
HGB BLD-MCNC: 12.1 G/DL (ref 12–16)
IMM GRANULOCYTES # BLD AUTO: 0.04 K/UL (ref 0–0.11)
IMM GRANULOCYTES NFR BLD AUTO: 0.3 % (ref 0–0.9)
LDLC SERPL CALC-MCNC: 60 MG/DL
LYMPHOCYTES # BLD AUTO: 1.71 K/UL (ref 1–4.8)
LYMPHOCYTES NFR BLD: 14.5 % (ref 22–41)
MCH RBC QN AUTO: 30.9 PG (ref 27–33)
MCHC RBC AUTO-ENTMCNC: 34.1 G/DL (ref 33.6–35)
MCV RBC AUTO: 90.6 FL (ref 81.4–97.8)
MONOCYTES # BLD AUTO: 1.14 K/UL (ref 0–0.85)
MONOCYTES NFR BLD AUTO: 9.6 % (ref 0–13.4)
NEUTROPHILS # BLD AUTO: 8.89 K/UL (ref 2–7.15)
NEUTROPHILS NFR BLD: 75.1 % (ref 44–72)
NRBC # BLD AUTO: 0 K/UL
NRBC BLD-RTO: 0 /100 WBC
PLATELET # BLD AUTO: 301 K/UL (ref 164–446)
PMV BLD AUTO: 9.8 FL (ref 9–12.9)
POTASSIUM SERPL-SCNC: 4 MMOL/L (ref 3.6–5.5)
PROT SERPL-MCNC: 6.6 G/DL (ref 6–8.2)
RBC # BLD AUTO: 3.92 M/UL (ref 4.2–5.4)
SODIUM SERPL-SCNC: 138 MMOL/L (ref 135–145)
TRIGL SERPL-MCNC: 68 MG/DL (ref 0–149)
WBC # BLD AUTO: 11.8 K/UL (ref 4.8–10.8)

## 2023-03-29 PROCEDURE — 99239 HOSP IP/OBS DSCHRG MGMT >30: CPT | Performed by: HOSPITALIST

## 2023-03-29 PROCEDURE — 80053 COMPREHEN METABOLIC PANEL: CPT

## 2023-03-29 PROCEDURE — 97165 OT EVAL LOW COMPLEX 30 MIN: CPT

## 2023-03-29 PROCEDURE — 700102 HCHG RX REV CODE 250 W/ 637 OVERRIDE(OP)

## 2023-03-29 PROCEDURE — 97163 PT EVAL HIGH COMPLEX 45 MIN: CPT

## 2023-03-29 PROCEDURE — 83036 HEMOGLOBIN GLYCOSYLATED A1C: CPT

## 2023-03-29 PROCEDURE — RXMED WILLOW AMBULATORY MEDICATION CHARGE: Performed by: HOSPITALIST

## 2023-03-29 PROCEDURE — 80061 LIPID PANEL: CPT

## 2023-03-29 PROCEDURE — 85025 COMPLETE CBC W/AUTO DIFF WBC: CPT

## 2023-03-29 PROCEDURE — 97535 SELF CARE MNGMENT TRAINING: CPT

## 2023-03-29 PROCEDURE — A9270 NON-COVERED ITEM OR SERVICE: HCPCS

## 2023-03-29 PROCEDURE — 700102 HCHG RX REV CODE 250 W/ 637 OVERRIDE(OP): Performed by: HOSPITALIST

## 2023-03-29 PROCEDURE — A9270 NON-COVERED ITEM OR SERVICE: HCPCS | Performed by: HOSPITALIST

## 2023-03-29 PROCEDURE — G0378 HOSPITAL OBSERVATION PER HR: HCPCS

## 2023-03-29 RX ORDER — TRAMADOL HYDROCHLORIDE 50 MG/1
50 TABLET ORAL 2 TIMES DAILY PRN
Qty: 14 TABLET | Refills: 0 | Status: CANCELLED | OUTPATIENT
Start: 2023-03-29 | End: 2023-04-05

## 2023-03-29 RX ORDER — TRAMADOL HYDROCHLORIDE 50 MG/1
50 TABLET ORAL 2 TIMES DAILY PRN
Qty: 14 TABLET | Refills: 0 | Status: SHIPPED | OUTPATIENT
Start: 2023-03-29 | End: 2023-04-05

## 2023-03-29 RX ORDER — TRAMADOL HYDROCHLORIDE 50 MG/1
50 TABLET ORAL EVERY 6 HOURS PRN
Status: DISCONTINUED | OUTPATIENT
Start: 2023-03-29 | End: 2023-03-29 | Stop reason: HOSPADM

## 2023-03-29 RX ORDER — ACETAMINOPHEN 500 MG
1000 TABLET ORAL 3 TIMES DAILY
Status: DISCONTINUED | OUTPATIENT
Start: 2023-03-29 | End: 2023-03-29 | Stop reason: HOSPADM

## 2023-03-29 RX ORDER — AMLODIPINE BESYLATE 2.5 MG/1
2.5 TABLET ORAL
Qty: 30 TABLET | Refills: 0 | Status: CANCELLED
Start: 2023-03-29

## 2023-03-29 RX ADMIN — ACETAMINOPHEN 1000 MG: 500 TABLET, FILM COATED ORAL at 08:15

## 2023-03-29 RX ADMIN — METOPROLOL SUCCINATE 12.5 MG: 25 TABLET, EXTENDED RELEASE ORAL at 05:31

## 2023-03-29 RX ADMIN — OMEPRAZOLE 20 MG: 20 CAPSULE, DELAYED RELEASE ORAL at 05:31

## 2023-03-29 RX ADMIN — TRAMADOL HYDROCHLORIDE 50 MG: 50 TABLET, COATED ORAL at 08:15

## 2023-03-29 ASSESSMENT — ACTIVITIES OF DAILY LIVING (ADL): TOILETING: INDEPENDENT

## 2023-03-29 ASSESSMENT — PATIENT HEALTH QUESTIONNAIRE - PHQ9
2. FEELING DOWN, DEPRESSED, IRRITABLE, OR HOPELESS: NOT AT ALL
SUM OF ALL RESPONSES TO PHQ9 QUESTIONS 1 AND 2: 0
1. LITTLE INTEREST OR PLEASURE IN DOING THINGS: NOT AT ALL

## 2023-03-29 ASSESSMENT — COGNITIVE AND FUNCTIONAL STATUS - GENERAL
SUGGESTED CMS G CODE MODIFIER DAILY ACTIVITY: CJ
CLIMB 3 TO 5 STEPS WITH RAILING: A LITTLE
MOVING TO AND FROM BED TO CHAIR: A LITTLE
PERSONAL GROOMING: A LITTLE
WALKING IN HOSPITAL ROOM: A LITTLE
TOILETING: A LITTLE
MOBILITY SCORE: 19
DAILY ACTIVITIY SCORE: 21
STANDING UP FROM CHAIR USING ARMS: A LITTLE
SUGGESTED CMS G CODE MODIFIER MOBILITY: CK
MOVING FROM LYING ON BACK TO SITTING ON SIDE OF FLAT BED: A LITTLE
HELP NEEDED FOR BATHING: A LITTLE

## 2023-03-29 ASSESSMENT — GAIT ASSESSMENTS
GAIT LEVEL OF ASSIST: STANDBY ASSIST
ASSISTIVE DEVICE: NONE;FRONT WHEEL WALKER
DISTANCE (FEET): 120
DEVIATION: SHUFFLED GAIT;BRADYKINETIC;DECREASED HEEL STRIKE;DECREASED TOE OFF

## 2023-03-29 ASSESSMENT — PAIN DESCRIPTION - PAIN TYPE: TYPE: ACUTE PAIN

## 2023-03-29 NOTE — ASSESSMENT & PLAN NOTE
Patient complains of losing her balance on Sunday.  Unclear etiology at this time.  No concern for cardiac syncope.  Patient is underweight with a BMI of 17 and has not been eating too well.  Possible differential includes deconditioning or transient cardiac arrhythmia.  -Telemetry monitoring and continuous pulse ox while inpatient  -Physical therapy and Occupational Therapy consult  -Patient may benefit from a  consult to assess for increasing needs in the setting of worsening dementia.  -Patient is a high fall risk

## 2023-03-29 NOTE — ASSESSMENT & PLAN NOTE
Patient complains of chest pain that started Sunday and has been persistent since then.  Nonexertional in nature.  Pain is reproducible, exquisite tenderness to left chest, on the underside of the left breast.  Appears to be MSK in nature.  Patient denies any trauma or injury to the area.  Denies any tachycardia, shortness of breath, fevers or chills.  No diaphoresis.  No feeling of impending doom.  Pain appears to be MSK in nature.  Troponin slightly elevated to 24 in the ED.  Heart score of 6, moderate risk.   -Admit to medicine for observation, telemetry monitoring and continuous pulse ox  -Lidocaine patch for MSK pain on the left chest  -Repeat troponin  -Assess for lipid panel and hemoglobin A1c to evaluate cardiovascular risk factors  -Pain is not cardiac in nature, no cardiac work-up indicated at this time.

## 2023-03-29 NOTE — THERAPY
Physical Therapy   Initial Evaluation     Patient Name: Tonia Villatoro  Age:  72 y.o., Sex:  female  Medical Record #: [id number]  Today's Date: 3/29/2023     Precautions  Precautions: Fall Risk  Comments: L flank/rib pain from fall    Assessment     Patient is 72 y.o. female admitted after GLF with L chest pain and increased weakness. PMHx of dementia, malnutrition, and multiple recent falls, as well as agoraphobia and anxiety since COVID.     She is evaluate in the CDU and found to be very near her functional baseline - I believe she was having trouble with mobility for a couple days due to pain and possibly limited nutrition/fluids.  She did have improved gait quality and balance with use of the FWW on this date, but she is not receptive to its use despite education, nor is she receptive to use of a shower chair suggestion - but possibly would consider grab bars.  SO present and supportive.  Patient would benefit form discharge home, with use of FWW - likely outpatient PT would be beneficial for higher level balance and address assistive device needs vs weaning.  No further acute PT needs, available for discharge needs should she remain acute.    Plan    Physical Therapy Initial Treatment Plan   Duration: Discharge Needs Only    DC Equipment Recommendations: Front-Wheel Walker  Discharge Recommendations: Recommend outpatient physical therapy services to address higher level deficits       Subjective    Pt agreeable to participate, tangential.  SO present and supportive.     Objective       03/29/23 0934    Services   Is patient using  services for this encounter? No   Initial Contact Note    Initial Contact Note Order Received and Verified, Physical Therapy Evaluation in Progress with Full Report to Follow.   Precautions   Precautions Fall Risk   Comments L flank/rib pain from fall   Vitals   Vitals Comments On room air, SpO2 wnl   Pain 0 - 10 Group   Therapist Pain Assessment Post  "Activity;During Activity;Nurse Notified   Prior Living Situation   Prior Services Intermittent Physical Support for ADL Per Family   Housing / Facility 1 Story House   Steps Into Home 2   Steps In Home 0   Bathroom Set up Walk In Shower   Equipment Owned None   Lives with - Patient's Self Care Capacity Significant Other   Comments Pt's ex- present and supportive. Reports lives with pt and is able to assist PRN.   Prior Level of Functional Mobility   Bed Mobility Independent   Transfer Status Independent   Ambulation Independent   Ambulation Distance community distance   Comments Pt states with the exception of recent fall, she is able to perform mobitliy and ADL ind.  Pt is not receptive to use of a shower chair \"I would just prefer not\" and though was more steady with gait using FWW, she states she would not use it, despite education.   History of Falls   History of Falls Yes   Date of Last Fall   (reason for admit)   Cognition    Comments Hx of dementia. Very pleasant and cooperative, but tangential req max redirection, and perseverative on \"getting back to being healthy\". Pt describes a lengthy routine around carb based diet as well as personal hygiene routine.  She reports being thankful and grateful for info regarding protein and vegetable and effects on energy and healing, but minimally [id number]   Active ROM Lower Body    Active ROM Lower Body  WDL   Strength Lower Body   Lower Body Strength  WDL   Coordination Lower Body    Coordination Lower Body  WDL   Other Treatments   Other Treatments Provided Extensive time spent reviewing househld mobility and safety, ie FWW and shower chair.  As well as diet and routine changes that could allow for increased protein intake - spouse reports she mainly eats cereal.   Balance Assessment   Sitting Balance (Static) Good   Sitting Balance (Dynamic) Fair +   Standing Balance (Static) Fair   Standing Balance (Dynamic) Fair   Weight Shift Sitting Good   Weight Shift " Standing Fair   Comments with FWW balance is improved, withut, fair -   Bed Mobility    Supine to Sit Standby Assist   Sit to Supine Standby Assist   Scooting Supervised   Gait Analysis   Gait Level Of Assist Standby Assist   Assistive Device None;Front Wheel Walker   Distance (Feet) 120  (cumulative)   Deviation Shuffled Gait;Bradykinetic;Decreased Heel Strike;Decreased Toe Off   Comments Walked to bathroom, SBA, shuffle, slow gait - 1 loss of abalnce with self recovery - step lengths no longer than foot length.  Then walked back to room form bathroom, with FWW - improved gait quality, balance and safety   Functional Mobility   Sit to Stand Standby Assist   Bed, Chair, Wheelchair Transfer Standby Assist   Toilet Transfers Standby Assist   Mobility room mobility, then gait and education   Comments Not receptive to safety recommendations   How much difficulty does the patient currently have...   Turning over in bed (including adjusting bedclothes, sheets and blankets)? 4   Sitting down on and standing up from a chair with arms (e.g., wheelchair, bedside commode, etc.) 3   Moving from lying on back to sitting on the side of the bed? 3   How much help from another person does the patient currently need...   Moving to and from a bed to a chair (including a wheelchair)? 3   Need to walk in a hospital room? 3   Climbing 3-5 steps with a railing? 3   6 clicks Mobility Score 19   Activity Tolerance   Sitting in Chair NT   Sitting Edge of Bed 15 mins   Standing 10 mins   Physical Therapy Initial Treatment Plan    Duration Discharge Needs Only   Anticipated Discharge Equipment and Recommendations   DC Equipment Recommendations Front-Wheel Walker   Discharge Recommendations Recommend outpatient physical therapy services to address higher level deficits   Interdisciplinary Plan of Care Collaboration   IDT Collaboration with  Nursing;Occupational Therapist;Family / Caregiver   Patient Position at End of Therapy In Bed;Bed Alarm  On;Call Light within Reach;Tray Table within Reach;Phone within Reach   Collaboration Comments RN aware of session   Session Information   Date / Session Number  3/29 - PT stacia and KRISTIN PT

## 2023-03-29 NOTE — THERAPY
"Occupational Therapy   Initial Evaluation     Patient Name: Tonia Villatoro  Age:  72 y.o., Sex:  female  Medical Record #: [id number]  Today's Date: 3/29/2023     Precautions  Precautions: Fall Risk  Comments: L flank/rib pain from fall    Assessment  Patient is 72 y.o. female admitted after GLF with L chest pain and increased weakness. PMHx of dementia, malnutrition, and multiple recent falls, as well as agoraphobia and anxiety since COVID. Req v/cs for attention to task throughout. Pt has poor insight into deficits. Educated pt and SO on home safety, DME for BR vs sponge bathing, safety with FWW during ADL/txfs, adaptive techniques for ADLs, importance of protein and vegetables in diet w/ time saving techniques, and importance of continued OOB activity.Pt is very pleasant and cooperative, but tangential req max redirection, and perseverative on \"getting back to being healthy\". Receptive to education, but questionable retention. SO reports pt only eats cereal w/blueberries and nuts and yogurt. Completed ADLs/txfs with [place]-SPV and functional ambulation w/FWW and SBA. Pt's ex- present and supportive. Reports lives with pt and is able to assist PRN. Patient will not be actively followed for occupational therapy services at this time, however may be seen if requested by physician for 1 more visit within 30 days to address any discharge or equipment needs.     Plan    Occupational Therapy Initial Treatment Plan   Duration: Discharge Needs Only    DC Equipment Recommendations: Tub / Shower Seat (reports will not use, but would rather sponge bathe)  Discharge Recommendations: Recommend home health for continued occupational therapy services (as long as SO able to assist and provide frequent SPV/assist)     Subjective    \"I feel so much better after that.\"     Objective     03/29/23 0836   Prior Living Situation   Prior Services Intermittent Physical Support for ADL Per Family   Housing / Facility 1 Story House " "  Steps Into Home 2   Bathroom Set up Walk In Shower   Equipment Owned None   Lives with - Patient's Self Care Capacity Significant Other   Comments Pt's ex- present and supportive. Reports lives with pt and is able to assist PRN.   Prior Level of ADL Function   Self Feeding Independent   Grooming / Hygiene Independent   Bathing Independent   Dressing Independent   Toileting Independent   Prior Level of IADL Function   Medication Management Dependent   Laundry Dependent   Kitchen Mobility Dependent   Finances Dependent   Home Management Dependent   Shopping Dependent   Prior Level Of Mobility Supervision With Device in Home   Driving / Transportation Relatives / Others Provide Transportation   History of Falls   History of Falls Yes   Date of Last Fall   (reason for admit)   Precautions   Precautions Fall Risk   Comments L flank/rib pain from fall   Vitals   O2 Delivery Device None - Room Air   Pain 0 - 10 Group   Location Flank;Rib Cage   Location Orientation Left   Therapist Pain Assessment Post Activity;During Activity;Nurse Notified   Cognition    Cognition / Consciousness X   Speech/ Communication Word Finding Impairment   Level of Consciousness Alert   Safety Awareness Impaired   New Learning Impaired   Attention Impaired   Sequencing Impaired   Comments Hx of dementia. Very pleasant and cooperative, but tangential req max redirection, and perseverative on \"getting back to being healthy\". Receptive to education, but questionable retention. SO reports pt only eats cereal w/blueberries and nuts and yogurt.   Passive ROM Upper Body   Passive ROM Upper Body WDL   Active ROM Upper Body   Active ROM Upper Body  WDL   Strength Upper Body   Upper Body Strength  X   Gross Strength Generalized Weakness, Equal Bilaterally.    Coordination Upper Body   Coordination WDL   Balance Assessment   Sitting Balance (Static) Good   Sitting Balance (Dynamic) Fair +   Standing Balance (Static) Fair   Standing Balance " (Dynamic) Fair   Weight Shift Sitting Good   Weight Shift Standing Fair   Comments w/fww, w/o AD fair-   Bed Mobility    Supine to Sit Standby Assist  (HOB slightly elevated and use of rail)   Sit to Supine Standby Assist   Scooting Supervised   ADL Assessment   Eating Supervision   Grooming Supervision;Standing  (washing hands)   Lower Body Dressing Supervision  (socks)   Toileting Supervision   Comments Req v/cs for attention to task throughout. Educated on home safety, DME for BR vs sponge bathing, safety with FWW during ADL/txfs, adaptive techniques for ADLs, importance of protein and vegetables in diet w/ time saving techniques, and importance of continued OOB activity.   Functional Mobility   Sit to Stand Contact Guard Assist   Bed, Chair, Wheelchair Transfer Contact Guard Assist   Toilet Transfers Contact Guard Assist   Transfer Method Stand Step   Mobility w/FWW; in room and hallway to BR   Visual Perception   Visual Perception  X   Comments reports decreased acuity   Edema / Skin Assessment   Edema / Skin  Not Assessed   Activity Tolerance   Comments limited by fatigue, cognition, and pain   Education Group   Education Provided Role of Occupational Therapist;Transfers;Home Safety;Pathology of bedrest;Activities of Daily Living;Adaptive Equipment;Energy Conservation   Role of Occupational Therapist Patient Response Patient;Significant Other;Acceptance;Explanation;Verbal Demonstration;Reinforcement Needed   Energy Conservation Patient Response Patient;Significant Other;Acceptance;Explanation;Verbal Demonstration;Reinforcement Needed   Home Safety Patient Response Patient;Significant Other;Acceptance;Explanation;Verbal Demonstration;Reinforcement Needed   Transfers Patient Response Patient;Significant Other;Acceptance;Explanation;Verbal Demonstration;Reinforcement Needed   ADL Patient Response Patient;Acceptance;Explanation;Verbal Demonstration;Reinforcement Needed;Significant Other   Adaptive Equipment  Patient Response Patient;Acceptance;Explanation;Reinforcement Needed;Significant Other;No Learning Evidence   Pathology of Bedrest Patient Response Patient;Acceptance;Explanation;Verbal Demonstration;Reinforcement Needed;Significant Other

## 2023-03-29 NOTE — DISCHARGE INSTRUCTIONS
-Take prescribed medication for pain control  -See primary care provider ASAP for follow up and referral to psychiatry  -drink plenty of fluid and resume normal eating habits

## 2023-03-29 NOTE — ASSESSMENT & PLAN NOTE
CBC showed leukocytosis of 14.2, unclear etiology at this time.  Patient denies any fevers or chills, no obvious source of infection. No bruises or rashes, no broken skin.  Denies any dyspnea or dysuria, urgency or frequency.  -Most likely reactive in nature secondary to fall  -Continue to monitor  -If patient develops fever or worsening hemodynamics, will do full infectious work-up

## 2023-03-29 NOTE — PROGRESS NOTES
Patient transported to d[facility] by [facility] staff. Patient NAD, IV removed and intact.  present and will be transporting patient home.

## 2023-03-29 NOTE — PROGRESS NOTES
Attention order to give patient a walker. I discussed walker with patient she politely stated that she would like to elect not to take the walker at this time.

## 2023-03-29 NOTE — ED NOTES
Med rec updated and complete. Allergies reviewed.  Confirmed name and date of birth. Pt denies antibiotic use in last 30 days.      Prescription for  AMLODIPINE is for BID dosing.  However, pt has only been taking it at night.        Home pharmacy [facility] [phone]

## 2023-03-29 NOTE — PROGRESS NOTES
4 Eyes Skin Assessment Completed by ANIL Garcia and ANIL Nichols.    Head WDL  Ears WDL  Nose WDL  Mouth WDL  Neck WDL  Breast/Chest WDL  Shoulder Blades WDL  Spine WDL  (R) Arm/Elbow/Hand WDL  (L) Arm/Elbow/Hand WDL  Abdomen WDL  Groin WDL  Scrotum/Coccyx/Buttocks WDL  (R) Leg WDL  (L) Leg Scab left knee  (R) Heel/Foot/Toe WDL  (L) Heel/Foot/Toe WDL          Devices In Places Pulse Ox      Interventions In Place Pillows    Possible Skin Injury No    Pictures Uploaded Into Epic N/A  Wound Consult Placed N/A  RN Wound Prevention Protocol Ordered No

## 2023-03-29 NOTE — H&P
"[facility] Internal Medicine History & Physical Note    Date of Service  3/28/2023    [facility] Team: KINA   Attending: YONATAN Avina A  Senior Resident: Dr. Brant Peterson  Contact Number: [phone]    Primary Care Physician  Pcp Pt States None    Consultants  None    Specialist Names: Not applicable    Code Status  Full Code    Chief Complaint  Chief Complaint   Patient presents with    Chest Pain     Chest pain that started on Sunday. Pt states she has pain all over, more so on her left side. Nonspecific pain.    Other     Multiple complaints, reports she has been more weak since Sunday and has had a couple of falls the previous few days. No head injury reported. Pt states she felt like she was dying but wanted her \"heavenly father to make the decision\". Pt very anxious in triage.        History of Presenting Illness (HPI):   Tonia Villatoro is a 72 y.o. female who presented 3/28/2023 with her  for the chief concern of chest pain and overall not feeling well.  Past medical history significant for hypertension for which patient takes metoprolol and amlodipine. Patient has significant memory issues with tangential thought process, so history was obtained partially by the patient but primarily by the  at bedside. On Sunday, patient woke up and was eating breakfast at the kitchen counter when she states that she felt this extreme weakness and pain all over her body and says that she had a feeling that she was going to die but \"wanted her heavenly father to make the decision.\"  Patient appears to be very anxious in the ED room.  The chest pain is not exertional in nature.  Reproducible with touch.  Patient has not taken any medications to make her pain go away.  Pain does not radiate.  Pain is only present when you press on the left chest on the underside of the left breast.  Patient does not recall any injury or trauma.  Says the pain is about 8-9/10.     at bedside states that her mental condition is " at baseline, where she has to use tangential thoughts.  This is not an acute change from her baseline.  Family history significant for severe dementia and patient's mother.  She is not in touch with the rest of her family so this information could not be obtained.    In the ED, patient is hemodynamically stable, CBC shows leukocytosis of 14.2, CMP is grossly unremarkable.  Troponin slightly elevated to 24, heart score of 6.  Abundant CT imaging was done in the ED, all of which showed no acute pathology.    I discussed the plan of care with patient and family.    Review of Systems  Review of Systems   Constitutional:  Negative for chills, fever and weight loss.   HENT:  Negative for congestion, hearing loss and sore throat.    Eyes:  Negative for blurred vision, double vision and discharge.   Respiratory:  Negative for cough, sputum production, shortness of breath and wheezing.    Cardiovascular:  Positive for chest pain. Negative for palpitations, orthopnea, claudication and leg swelling.   Gastrointestinal:  Negative for abdominal pain, blood in stool, constipation, diarrhea, heartburn, melena, nausea and vomiting.   Genitourinary:  Negative for dysuria, frequency, hematuria and urgency.   Musculoskeletal:  Positive for falls. Negative for back pain, joint pain and neck pain.   Skin:  Negative for rash.   Neurological:  Negative for dizziness, sensory change, weakness and headaches.   Endo/Heme/Allergies:  Negative for environmental allergies. Does not bruise/bleed easily.   Psychiatric/Behavioral:  Positive for memory loss. Negative for depression. The patient is not nervous/anxious and does not have insomnia.      Past Medical History  Past medical history significant for hypertension    Surgical History  No surgical history to the patient or  was able to report.    Family History  Family she is significant for severe dementia and the mother and father. Family history reviewed with patient and  .    Social History  Tobacco: None  Alcohol: None  Recreational drugs (illegal or prescription): None  Employment: None  Living Situation: Lives at home with   Recent Travel: None  Primary Care Provider: Not Reviewed  Other (stressors, spirituality, exposures): None    Allergies  No Known Allergies    Medications  Prior to Admission Medications   Prescriptions Last Dose Informant Patient Reported? Taking?   Cholecalciferol (VITAMIN D) 50 MCG (2000 UT) Cap   Yes No   Sig: Take  by mouth.   Multiple Vitamin (MULTIVITAMIN ADULT PO)   Yes No   Sig: Take  by mouth.   acyclovir (ZOVIRAX) 5 % Ointment   Yes No   Sig: Apply  topically as needed.   amLODIPine (NORVASC) 2.5 MG Tab   No No   Sig: Take 1 Tablet by mouth 2 times a day.   artificial tears (EYE LUBRICANT) Ointment ophth ointment   Yes No   Sig: Apply 1 Application to both eyes every 8 hours.   metoprolol SR (TOPROL XL) 25 MG TABLET SR 24 HR   No No   Sig: Take 0.5 Tablets by mouth 2 times a day. 12.5 mg BID   rabeprazole (ACIPHEX) 20 MG tablet   Yes No   Sig: Take 20 mg by mouth every day.      Facility-Administered Medications: None       Physical Exam  Temp:  [36.6 °C (97.9 °F)-37.1 °C (98.8 °F)] 36.6 °C (97.9 °F)  Pulse:  [72-81] 74  Resp:  [16-18] 17  BP: (119-142)/(72-87) 142/87  SpO2:  [95 %-98 %] 95 %  Blood Pressure : 125/72   Temperature: 37.1 °C (98.8 °F)   Pulse: 75   Respiration: 18   Pulse Oximetry: 98 %       Physical Exam  Constitutional:       Appearance: Normal appearance. She is normal weight.   HENT:      Head: Normocephalic and atraumatic.      Right Ear: External ear normal.      Left Ear: External ear normal.      Nose: Nose normal. No congestion.      Mouth/Throat:      Mouth: Mucous membranes are moist.      Pharynx: Oropharynx is clear.   Eyes:      General:         Right eye: No discharge.         Left eye: No discharge.      Extraocular Movements: Extraocular movements intact.      Conjunctiva/sclera: Conjunctivae normal.       Pupils: Pupils are equal, round, and reactive to light.   Neck:      Vascular: No carotid bruit.   Cardiovascular:      Rate and Rhythm: Normal rate and regular rhythm.      Pulses: Normal pulses.      Heart sounds: Normal heart sounds. No murmur heard.    No friction rub.   Pulmonary:      Effort: Pulmonary effort is normal. No respiratory distress.      Breath sounds: Normal breath sounds. No wheezing or rhonchi.   Abdominal:      General: Abdomen is flat. Bowel sounds are normal.      Palpations: Abdomen is soft. There is no mass.      Tenderness: There is no abdominal tenderness. There is no right CVA tenderness or left CVA tenderness.   Musculoskeletal:         General: Tenderness (Left-sided chest tenderness, reproducible in the mid axillary line) present. Normal range of motion.      Cervical back: Normal range of motion and neck supple. No rigidity.      Right lower leg: No edema.      Left lower leg: No edema.   Skin:     General: Skin is warm.      Capillary Refill: Capillary refill takes less than 2 seconds.      Findings: No lesion or rash.   Neurological:      General: No focal deficit present.      Mental Status: She is alert and oriented to person, place, and time. Mental status is at baseline.   Psychiatric:         Mood and Affect: Mood normal.         Behavior: Behavior normal.         Thought Content: Thought content normal.         Judgment: Judgment normal.       Laboratory:  Recent Labs     03/28/23  1547   WBC 14.2*   RBC 4.09*   HEMOGLOBIN 12.4   HEMATOCRIT 37.8   MCV 92.4   MCH 30.3   MCHC 32.8*   RDW 50.4*   PLATELETCT 325   MPV 9.3     Recent Labs     03/28/23  1547   SODIUM 134*   POTASSIUM 4.5   CHLORIDE 97   CO2 25   GLUCOSE 132*   BUN 15   CREATININE 0.66   CALCIUM 9.6     Recent Labs     03/28/23  1547   ALTSGPT 48   ASTSGOT 46*   ALKPHOSPHAT 132*   TBILIRUBIN 0.9   GLUCOSE 132*     Recent Labs     03/28/23  1547   TROPONINT 24*       Imaging:  CT-LSPINE W/O PLUS RECONS   Final  Result      NO ACUTE FRACTURE OR DISLOCATION IS PRESENT IN THE LUMBAR SPINE.      CT-TSPINE W/O PLUS RECONS   Final Result      No evidence of fracture or dislocation of the thoracic spine.      CT-CSPINE WITHOUT PLUS RECONS   Final Result      1.  No evidence of cervical spine fracture.      2.  Multilevel degenerative disc disease and facet degeneration.      3.  Multilevel degenerative subluxation.      CT-HEAD W/O   Final Result         NO ACUTE ABNORMALITIES ARE NOTED ON CT SCAN OF THE HEAD.      Findings are consistent with atrophy.  Decreased attenuation in the periventricular white matter likely indicates microvascular ischemic disease.         CT-CHEST,ABDOMEN,PELVIS WITH   Final Result      1.  No solid organ injury identified.      2.  Post cholecystectomy. Mild intrahepatic biliary ductal dilatation.      3.  Follow-up thyroid ultrasound is recommended for left-sided thyroid nodule.      DX-CHEST-PORTABLE (1 VIEW)   Final Result      No acute cardiopulmonary disease.          Assessment/Plan:  Problem Representation:   72-year-old female with a past medical history significant for hypertension presented to the ED for concerns of pain all over her body and fall resulted in left chest MSK pain.  Patient has a heart score of 6, will be admitted for inpatient observation.    I anticipate this patient is appropriate for observation status at this time because moderate risk for heart score of 6 and further evaluation for chest pain.    Patient will need a Telemetry bed on EMERGENCY service .  The need is secondary to chest pain.    * Chest pain- (present on admission)  Assessment & Plan  Patient complains of chest pain that started Sunday and has been persistent since then.  Nonexertional in nature.  Pain is reproducible, exquisite tenderness to left chest, on the underside of the left breast.  Appears to be MSK in nature.  Patient denies any trauma or injury to the area.  Denies any tachycardia, shortness of  breath, fevers or chills.  No diaphoresis.  No feeling of impending doom.  Pain appears to be MSK in nature.  Troponin slightly elevated to 24 in the ED.  Heart score of 6, moderate risk.   -Admit to medicine for observation, telemetry monitoring and continuous pulse ox  -Lidocaine patch for MSK pain on the left chest  -Repeat troponin  -Assess for lipid panel and hemoglobin A1c to evaluate cardiovascular risk factors  -Pain is not cardiac in nature, no cardiac work-up indicated at this time.    Fall  Assessment & Plan  Patient complains of losing her balance on Sunday.  Unclear etiology at this time.  No concern for cardiac syncope.  Patient is underweight with a BMI of 17 and has not been eating too well.  Possible differential includes deconditioning or transient cardiac arrhythmia.  -Telemetry monitoring and continuous pulse ox while inpatient  -Physical therapy and Occupational Therapy consult  -Patient may benefit from a  consult to assess for increasing needs in the setting of worsening dementia.  -Patient is a high fall risk    Leukocytosis  Assessment & Plan  CBC showed leukocytosis of 14.2, unclear etiology at this time.  Patient denies any fevers or chills, no obvious source of infection. No bruises or rashes, no broken skin.  Denies any dyspnea or dysuria, urgency or frequency.  -Most likely reactive in nature secondary to fall  -Continue to monitor  -If patient develops fever or worsening hemodynamics, will do full infectious work-up    Primary hypertension- (present on admission)  Assessment & Plan  This is a chronic condition.  -Continue home amlodipine    PSVT (paroxysmal supraventricular tachycardia) (HCC)- (present on admission)  Assessment & Plan  This is a chronic condition  -Continue home metoprolol      VTE prophylaxis: enoxaparin ppx

## 2023-03-30 NOTE — DISCHARGE SUMMARY
"Discharge Summary    CHIEF COMPLAINT ON ADMISSION  Chief Complaint   Patient presents with    Chest Pain     Chest pain that started on Sunday. Pt states she has pain all over, more so on her left side. Nonspecific pain.    Other     Multiple complaints, reports she has been more weak since Sunday and has had a couple of falls the previous few days. No head injury reported. Pt states she felt like she was dying but wanted her \"heavenly father to make the decision\". Pt very anxious in triage.        Reason for Admission  chest pain     Admission Date  3/28/2023    CODE STATUS  Prior    HPI & HOSPITAL COURSE  Tonia Villatoro is a 72 y.o. female who presented 3/28/2023 with her  for the chief concern of chest pain and overall not feeling well.  Past medical history significant for hypertension for which patient takes metoprolol and amlodipine. Patient has significant memory issues with tangential thought process, so history was obtained partially by the patient but primarily by the  at bedside. On Sunday, patient woke up and was eating breakfast at the kitchen counter when she states that she felt this extreme weakness and pain all over her body and says that she had a feeling that she was going to die but \"wanted her heavenly father to make the decision.\"  Patient appears to be very anxious in the ED room.  The chest pain is not exertional in nature.  Reproducible with touch.  Patient has not taken any medications to make her pain go away.  Pain does not radiate.  Pain is only present when you press on the left chest on the underside of the left breast.  Patient does not recall any injury or trauma.  Says the pain is about 8-9/10.      at bedside states that her mental condition is at baseline, where she has to use tangential thoughts.  This is not an acute change from her baseline.  Family history significant for severe dementia and patient's mother.  She is not in touch with the rest of her " family so this information could not be obtained.     In the ED, patient is hemodynamically stable, CBC shows leukocytosis of 14.2, CMP is grossly unremarkable.  Troponin slightly elevated to 24, heart score of 6.  Abundant CT imaging was done in the ED, all of which showed no acute pathology.      =========================================    Patient's hospital course included hydration by PT and OT who recommended home health and a front wheel walker.  Patient refused both.  Her pain appears to be musculoskeletal and patient was started on Ultram for such.  There is no evidence of any other medical condition that required further intervention and patient was cleared for discharge home on 3/29/2023.      Therefore, she is discharged in good and stable condition to home with close outpatient follow-up.    The patient recovered much more quickly than anticipated on admission.    Discharge Date  3/29/2023    FOLLOW UP ITEMS POST DISCHARGE      DISCHARGE DIAGNOSES  Principal Problem:    Musculoskeletal chest pain POA: Yes  Active Problems:    PSVT (paroxysmal supraventricular tachycardia) ([facility]) POA: Yes    Primary hypertension POA: Yes    Leukocytosis POA: Yes    Fall POA: Yes  Resolved Problems:    * No resolved hospital problems. *      FOLLOW UP  No future appointments.  [facility], Emergency Dept  [address] [id number] [phone]  Go to  As needed      MEDICATIONS ON DISCHARGE     Medication List        START taking these medications        Instructions   traMADol 50 MG Tabs  Commonly known as: Ultram   Take 1 Tablet by mouth 2 times a day as needed for Moderate Pain for up to 7 days.  Dose: 50 mg            CONTINUE taking these medications        Instructions   carboxymethylcellulose 1 % Gel   1 Drop as needed.  Dose: 1 Drop     Centrum Silver 50+Women Tabs   Take 1 Tablet by mouth every day.  Dose: 1 Tablet     metoprolol SR 25 MG Tb24  Commonly known as: TOPROL XL   Take  0.5 Tablets by mouth 2 times a day. 12.5 mg BID  Dose: 12.5 mg     rabeprazole 20 MG tablet  Commonly known as: ACIPHEX   Take 20 mg by mouth every day.  Dose: 20 mg     Vitamin D 50 MCG (2000 UT) Caps   Take 4,000 Units by mouth every day.  Dose: 4,000 Units            ASK your doctor about these medications        Instructions   amLODIPine 2.5 MG Tabs  Commonly known as: NORVASC   Take 1 Tablet by mouth 2 times a day.  Dose: 2.5 mg              Allergies  No Known Allergies    DIET  No orders of the defined types were placed in this encounter.      ACTIVITY  As tolerated.  Weight bearing as tolerated    CONSULTATIONS      PROCEDURES      LABORATORY  Lab Results   Component Value Date    SODIUM 138 03/29/2023    POTASSIUM 4.0 03/29/2023    CHLORIDE 100 03/29/2023    CO2 25 03/29/2023    GLUCOSE 88 03/29/2023    BUN 10 03/29/2023    CREATININE 0.43 (L) 03/29/2023        Lab Results   Component Value Date    WBC 11.8 (H) 03/29/2023    HEMOGLOBIN 12.1 03/29/2023    HEMATOCRIT 35.5 (L) 03/29/2023    PLATELETCT 301 03/29/2023        Total time of the discharge process exceeds 37 minutes.